# Patient Record
Sex: MALE | Race: AMERICAN INDIAN OR ALASKA NATIVE | URBAN - METROPOLITAN AREA
[De-identification: names, ages, dates, MRNs, and addresses within clinical notes are randomized per-mention and may not be internally consistent; named-entity substitution may affect disease eponyms.]

---

## 2021-07-14 ENCOUNTER — OFFICE VISIT (OUTPATIENT)
Dept: URBAN - METROPOLITAN AREA CLINIC 41 | Facility: CLINIC | Age: 51
End: 2021-07-14
Payer: OTHER GOVERNMENT

## 2021-07-14 DIAGNOSIS — H35.712 CENTRAL SEROUS CHORIORETINOPATHY, LEFT EYE: Primary | ICD-10-CM

## 2021-07-14 DIAGNOSIS — H25.13 AGE-RELATED NUCLEAR CATARACT, BILATERAL: ICD-10-CM

## 2021-07-14 DIAGNOSIS — E11.9 TYPE 2 DIABETES MELLITUS WITHOUT COMPLICATIONS: ICD-10-CM

## 2021-07-14 PROCEDURE — 92012 INTRM OPH EXAM EST PATIENT: CPT | Performed by: OPHTHALMOLOGY

## 2021-07-14 PROCEDURE — 92134 CPTRZ OPH DX IMG PST SGM RTA: CPT | Performed by: OPHTHALMOLOGY

## 2021-07-14 ASSESSMENT — INTRAOCULAR PRESSURE
OS: 7
OD: 8

## 2021-07-14 NOTE — IMPRESSION/PLAN
Impression: Type 2 diabetes mellitus without complications: K85.4 Bilateral. Plan: BS/BP/lipid control discussed. Observe.

## 2021-07-14 NOTE — IMPRESSION/PLAN
Impression: Central serous chorioretinopathy, left eye: H35.712. Left. Plan: Exam and OCT show resolved submacular fluid OS. Given the interval improvement, treatment is not indicated at this time. The patient was instructed to avoid exogenous steroids at all times. He will call us with s/s dec VA in the future. Cont AG monitoring. 

RTC PRN

## 2022-08-31 ENCOUNTER — OFFICE VISIT (OUTPATIENT)
Dept: URBAN - METROPOLITAN AREA CLINIC 27 | Facility: CLINIC | Age: 52
End: 2022-08-31
Payer: OTHER GOVERNMENT

## 2022-08-31 DIAGNOSIS — H35.712 CENTRAL SEROUS CHORIORETINOPATHY, LEFT EYE: Primary | ICD-10-CM

## 2022-08-31 DIAGNOSIS — E11.9 TYPE 2 DIABETES MELLITUS WITHOUT COMPLICATIONS: ICD-10-CM

## 2022-08-31 DIAGNOSIS — H25.13 AGE-RELATED NUCLEAR CATARACT, BILATERAL: ICD-10-CM

## 2022-08-31 PROCEDURE — 99214 OFFICE O/P EST MOD 30 MIN: CPT | Performed by: OPHTHALMOLOGY

## 2022-08-31 PROCEDURE — 92134 CPTRZ OPH DX IMG PST SGM RTA: CPT | Performed by: OPHTHALMOLOGY

## 2022-08-31 ASSESSMENT — INTRAOCULAR PRESSURE
OD: 17
OS: 10

## 2022-08-31 NOTE — IMPRESSION/PLAN
Impression: h/o Central serous chorioretinopathy, OS. Plan: Exam and OCT reveal resolved submacular fluid OS.   Follow

## 2022-08-31 NOTE — IMPRESSION/PLAN
Impression: NPDR OU Plan: DM since age 26. Mild. Exam and OCT reveal no DME OU. Will check IVFA. Thanks, María Hunt Return in 2 months for follow up, OCT OU, IVFA OD 1st

## 2022-10-28 ENCOUNTER — OFFICE VISIT (OUTPATIENT)
Dept: URBAN - METROPOLITAN AREA CLINIC 27 | Facility: CLINIC | Age: 52
End: 2022-10-28
Payer: OTHER GOVERNMENT

## 2022-10-28 DIAGNOSIS — E11.9 TYPE 2 DIABETES MELLITUS WITHOUT COMPLICATIONS: Primary | ICD-10-CM

## 2022-10-28 DIAGNOSIS — H25.13 AGE-RELATED NUCLEAR CATARACT, BILATERAL: ICD-10-CM

## 2022-10-28 DIAGNOSIS — E11.3212 TYPE 2 DIAB WITH MILD NONP RTNOP WITH MACULAR EDEMA, L EYE: ICD-10-CM

## 2022-10-28 DIAGNOSIS — H35.712 CENTRAL SEROUS CHORIORETINOPATHY, LEFT EYE: ICD-10-CM

## 2022-10-28 PROCEDURE — 92014 COMPRE OPH EXAM EST PT 1/>: CPT | Performed by: OPHTHALMOLOGY

## 2022-10-28 PROCEDURE — 92134 CPTRZ OPH DX IMG PST SGM RTA: CPT | Performed by: OPHTHALMOLOGY

## 2022-10-28 PROCEDURE — 92235 FLUORESCEIN ANGRPH MLTIFRAME: CPT | Performed by: OPHTHALMOLOGY

## 2022-10-28 ASSESSMENT — INTRAOCULAR PRESSURE
OD: 11
OS: 10

## 2022-10-28 NOTE — IMPRESSION/PLAN
Impression: PDR OD  Plan: DM since age 26. Mild. Exam and OCT reveal no DME OU. An IVFA from 10/28/2022 demonstrated NVE OD. Fundus Photos from 10/28/2022 demonstrated IRH. Recommend Avastin. RBA discussed. The patient elects Avastin. Thanks, Cristin Miller Return in 1 week Avastin OD (AUTH needed) then in 2 months for follow up, OCT OU

## 2022-10-28 NOTE — IMPRESSION/PLAN
Impression: NPDR OS Plan: DM since age 26. Mild. Exam and OCT reveal no DME. An IVFA from 10/28/2022 demonstrated  no NVE OS. Fundus Photos from 10/28/2022 demonstrated IRH. Recommend Avastin. RBA discussed. The patient elects Avastin.

## 2023-01-12 ENCOUNTER — OFFICE VISIT (OUTPATIENT)
Dept: URBAN - METROPOLITAN AREA CLINIC 41 | Facility: CLINIC | Age: 53
End: 2023-01-12
Payer: OTHER GOVERNMENT

## 2023-01-12 DIAGNOSIS — E11.37X1: Primary | ICD-10-CM

## 2023-01-12 DIAGNOSIS — E11.3212 TYPE 2 DIAB WITH MILD NONP RTNOP WITH MACULAR EDEMA, L EYE: ICD-10-CM

## 2023-01-12 DIAGNOSIS — H35.712 CENTRAL SEROUS CHORIORETINOPATHY, LEFT EYE: ICD-10-CM

## 2023-01-12 DIAGNOSIS — H25.13 AGE-RELATED NUCLEAR CATARACT, BILATERAL: ICD-10-CM

## 2023-01-12 PROCEDURE — 92014 COMPRE OPH EXAM EST PT 1/>: CPT | Performed by: OPHTHALMOLOGY

## 2023-01-12 PROCEDURE — 92134 CPTRZ OPH DX IMG PST SGM RTA: CPT | Performed by: OPHTHALMOLOGY

## 2023-01-12 PROCEDURE — 67028 INJECTION EYE DRUG: CPT | Performed by: OPHTHALMOLOGY

## 2023-01-12 ASSESSMENT — INTRAOCULAR PRESSURE
OS: 9
OD: 10

## 2023-01-12 NOTE — IMPRESSION/PLAN
Impression: NPDR Plan: DM since age 26. Moderate. Exam and OCT reveal no DME. An IVFA from 10/28/2022 demonstrated  no NVE OS. Fundus Photos from 10/28/2022 demonstrated IRH. Recommend Avastin. RBA discussed. The patient elects Avastin.

## 2023-01-12 NOTE — IMPRESSION/PLAN
Impression: PDR OD Plan: PDR OD
DM since age 26. Moderate. Exam and OCT reveal no DME OD. An IVFA from 10/28/2022 demonstrated NVE OD. Fundus Photos from 10/28/2022 demonstrated IRH. Recommend Avastin. RBA discussed. The patient elects Avastin. Thanks, Vinod Wright Return in 6 weeks for follow up, OCT OU, possible Avastin OU

## 2023-04-06 ENCOUNTER — OFFICE VISIT (OUTPATIENT)
Dept: URBAN - METROPOLITAN AREA CLINIC 41 | Facility: CLINIC | Age: 53
End: 2023-04-06
Payer: OTHER GOVERNMENT

## 2023-04-06 DIAGNOSIS — E11.37X1: Primary | ICD-10-CM

## 2023-04-06 DIAGNOSIS — H25.13 AGE-RELATED NUCLEAR CATARACT, BILATERAL: ICD-10-CM

## 2023-04-06 DIAGNOSIS — E11.3212 TYPE 2 DIAB WITH MILD NONP RTNOP WITH MACULAR EDEMA, L EYE: ICD-10-CM

## 2023-04-06 DIAGNOSIS — H35.712 CENTRAL SEROUS CHORIORETINOPATHY, LEFT EYE: ICD-10-CM

## 2023-04-06 PROCEDURE — 92014 COMPRE OPH EXAM EST PT 1/>: CPT | Performed by: OPHTHALMOLOGY

## 2023-04-06 ASSESSMENT — INTRAOCULAR PRESSURE
OS: 7
OD: 10

## 2023-04-06 NOTE — IMPRESSION/PLAN
Impression: PDR OD
s/p Avastin x last 01/12/2023  Plan: DM since age 26. Moderate. Exam and OCT reveal no DME OD. An IVFA from 10/28/2022 demonstrated NVE OD. Fundus Photos from 10/28/2022 demonstrated IRH. Recommend Avastin. RBA discussed. The patient elects Avastin. Carotenoids. Thanks, Oneda Mooring Return in 8 weeks for follow up, OCT OU, possible Avastin OU, IVFA OD 1st

## 2023-04-06 NOTE — IMPRESSION/PLAN
Impression: NPDR
s/p Avastin x last 01/12/2023  Plan: DM since age 26. Moderate. Exam and OCT reveal no DME. An IVFA from 10/28/2022 demonstrated  no NVE OS. Fundus Photos from 10/28/2022 demonstrated IRH. Recommend Avastin. RBA discussed. The patient elects Avastin.

## 2023-05-04 ENCOUNTER — OFFICE VISIT (OUTPATIENT)
Dept: URBAN - METROPOLITAN AREA CLINIC 41 | Facility: CLINIC | Age: 53
End: 2023-05-04
Payer: OTHER GOVERNMENT

## 2023-05-04 DIAGNOSIS — E11.37X1: Primary | ICD-10-CM

## 2023-05-04 DIAGNOSIS — H25.13 AGE-RELATED NUCLEAR CATARACT, BILATERAL: ICD-10-CM

## 2023-05-04 DIAGNOSIS — H35.712 CENTRAL SEROUS CHORIORETINOPATHY, LEFT EYE: ICD-10-CM

## 2023-05-04 DIAGNOSIS — E11.3212 TYPE 2 DIAB WITH MILD NONP RTNOP WITH MACULAR EDEMA, L EYE: ICD-10-CM

## 2023-05-04 PROCEDURE — 92134 CPTRZ OPH DX IMG PST SGM RTA: CPT | Performed by: OPHTHALMOLOGY

## 2023-05-04 PROCEDURE — 92014 COMPRE OPH EXAM EST PT 1/>: CPT | Performed by: OPHTHALMOLOGY

## 2023-05-04 ASSESSMENT — INTRAOCULAR PRESSURE
OS: 16
OD: 14

## 2023-05-04 NOTE — IMPRESSION/PLAN
Impression: PDR OD
s/p Avastin x last 04/06/2023 Plan: DM since age 26. Moderate. Exam and OCT reveal no DME OD. An IVFA from 10/28/2022 demonstrated NVE OD. Fundus Photos from 10/28/2022 demonstrated IRH. Recommend Avastin. RBA discussed. The patient elects Avastin. Carotenoids. Thanks, Tim Galvan Return in 8 weeks for follow up, OCT OU, possible Avastin OU, IVFA OD 1st

## 2023-06-19 ENCOUNTER — OFFICE VISIT (OUTPATIENT)
Dept: URBAN - METROPOLITAN AREA CLINIC 41 | Facility: CLINIC | Age: 53
End: 2023-06-19
Payer: OTHER GOVERNMENT

## 2023-06-19 DIAGNOSIS — H25.13 AGE-RELATED NUCLEAR CATARACT, BILATERAL: ICD-10-CM

## 2023-06-19 DIAGNOSIS — E11.3212 TYPE 2 DIAB WITH MILD NONP RTNOP WITH MACULAR EDEMA, L EYE: ICD-10-CM

## 2023-06-19 DIAGNOSIS — E11.37X1: ICD-10-CM

## 2023-06-19 DIAGNOSIS — H35.712 CENTRAL SEROUS CHORIORETINOPATHY, LEFT EYE: ICD-10-CM

## 2023-06-19 DIAGNOSIS — E11.3511 TYPE 2 DIABETES MELLITUS W/ PROLIFERATIVE DIABETIC RETINOPATHY W/ MACULAR EDEMA, RIGHT EYE: Primary | ICD-10-CM

## 2023-06-19 PROCEDURE — 92014 COMPRE OPH EXAM EST PT 1/>: CPT | Performed by: OPHTHALMOLOGY

## 2023-06-19 PROCEDURE — 92134 CPTRZ OPH DX IMG PST SGM RTA: CPT | Performed by: OPHTHALMOLOGY

## 2023-06-19 ASSESSMENT — INTRAOCULAR PRESSURE
OD: 10
OS: 9

## 2023-06-19 NOTE — IMPRESSION/PLAN
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
-s/p Avastin 05/04/2023-DYK Plan: DM since age 26. Moderate. Exam and OCT reveal no DME. An IVFA from 10/28/2022 demonstrated  no NVE OS. Fundus Photos from 10/28/2022 demonstrated IRH. Recommend Avastin. RBA discussed. The patient elects Avastin.

## 2023-06-19 NOTE — IMPRESSION/PLAN
Impression: Type 2 diabetes mellitus w/ proliferative diabetic retinopathy w/ macular edema, right eye A22.8604
-s/p Avastin 05/04/2023-DYK Plan: DM since age 26. Moderate. Exam and OCT reveal no DME OD. An IVFA from 10/28/2022 demonstrated NVE OD. Fundus Photos from 10/28/2022 demonstrated IRH. Recommend Avastin today and extend to 3 mo. RBA discussed. The patient elects Avastin. Adriane. ThanksJuan Intravitreal Avastin injected OU today without complication.  

Return in 12 weeks for follow up, OCT OU, possible Avastin OU, IVFA OD 1st

## 2023-06-19 NOTE — IMPRESSION/PLAN
Impression: Central serous chorioretinopathy, left eye H35.712 Plan: Exam and OCT reveal resolved submacular fluid OS.   Follow

## 2023-06-19 NOTE — IMPRESSION/PLAN
Impression: Type 2 diab with diab mclr edema, resolved fol trtmt, r eye  E11.37x1 Plan: see plan for PDR with DME

## 2025-08-04 ENCOUNTER — OFFICE VISIT (OUTPATIENT)
Dept: URBAN - METROPOLITAN AREA CLINIC 17 | Facility: CLINIC | Age: 55
End: 2025-08-04
Payer: COMMERCIAL

## 2025-08-04 DIAGNOSIS — H57.02 ANISOCORIA: ICD-10-CM

## 2025-08-04 DIAGNOSIS — H25.12 AGE-RELATED NUCLEAR CATARACT, LEFT EYE: ICD-10-CM

## 2025-08-04 DIAGNOSIS — H25.811 COMBINED FORMS OF AGE-RELATED CATARACT, RIGHT EYE: Primary | ICD-10-CM

## 2025-08-04 PROCEDURE — 99204 OFFICE O/P NEW MOD 45 MIN: CPT | Performed by: OPHTHALMOLOGY

## 2025-08-04 RX ORDER — PREDNISOLONE ACETATE 10 MG/ML
1 % SUSPENSION/ DROPS OPHTHALMIC
Qty: 10 | Refills: 1 | Status: ACTIVE
Start: 2025-08-04

## 2025-08-04 RX ORDER — OFLOXACIN 3 MG/ML
0.3 % SOLUTION/ DROPS OPHTHALMIC
Qty: 5 | Refills: 1 | Status: ACTIVE
Start: 2025-08-04

## 2025-08-04 ASSESSMENT — VISUAL ACUITY
OS: 20/25
OD: 20/50

## 2025-08-04 ASSESSMENT — INTRAOCULAR PRESSURE
OD: 16
OS: 16

## 2025-08-04 ASSESSMENT — KERATOMETRY
OS: 43.00
OD: 42.50